# Patient Record
Sex: MALE | Race: WHITE | Employment: UNEMPLOYED | URBAN - METROPOLITAN AREA
[De-identification: names, ages, dates, MRNs, and addresses within clinical notes are randomized per-mention and may not be internally consistent; named-entity substitution may affect disease eponyms.]

---
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
## 2024-01-10 ENCOUNTER — OFFICE VISIT (OUTPATIENT)
Dept: NEUROLOGY | Facility: CLINIC | Age: 19
End: 2024-01-10
Payer: COMMERCIAL

## 2024-01-10 VITALS
DIASTOLIC BLOOD PRESSURE: 80 MMHG | HEIGHT: 71 IN | BODY MASS INDEX: 20.05 KG/M2 | SYSTOLIC BLOOD PRESSURE: 120 MMHG | HEART RATE: 83 BPM | WEIGHT: 143.2 LBS

## 2024-01-10 DIAGNOSIS — G43.109 MIGRAINE WITH AURA AND WITHOUT STATUS MIGRAINOSUS, NOT INTRACTABLE: Primary | ICD-10-CM

## 2024-01-10 PROCEDURE — 99213 OFFICE O/P EST LOW 20 MIN: CPT | Performed by: PSYCHIATRY & NEUROLOGY

## 2024-01-10 NOTE — PROGRESS NOTES
Oh Monroe is a 18 y.o. male who returns in follow-up today with history of migraine headache    Assessment:  1. Migraine with aura and without status migrainosus, not intractable        Plan:  Continue Imitrex as needed  Follow-up 1 year    Discussion:  Oh reports 1 migraine headache over the last year and found the 25 mg Imitrex effective in stopping it.  He noted no adverse effect.  If he needs refills he will notify me otherwise I will see him back in follow-up in 1 year      Subjective:  Oh returns in follow-up today.  He reports that since he was here last a year ago he is only had 1 migraine headache.  He states he took Imitrex and it got rid of the headache effectively.  Is happy that the frequency of his headaches has improved.  He denies any new medical issues and is currently working and going to school online      Past Medical History:   Diagnosis Date    Abnormal head MRI 6/6/2018    Acute non-recurrent maxillary sinusitis 5/31/2018    Concussion without loss of consciousness 6/14/2019    Evaluated in the [facility] ER    Croup 11/09/2015    GERD (gastroesophageal reflux disease) 09/2017    Headache 9/15/2017    History of torn meniscus of left knee     Lyme disease 06/2015    Strep throat        Family History:  Family History   Problem Relation Age of Onset    No Known Problems Mother     No Known Problems Father     No Known Problems Sister     Cancer Maternal Grandmother         colon    Stroke Paternal Grandmother     Diabetes Paternal Grandmother     Cancer Family         MGGM-colon    Diabetes Family         PGGM    Alcohol abuse Neg Hx     Drug abuse Neg Hx     Mental illness Neg Hx        Past Surgical History:  Past Surgical History:   Procedure Laterality Date    NO PAST SURGERIES         Social History:   reports that he has never smoked. He has never used smokeless tobacco. He reports that he does not drink alcohol and does not use drugs.    Allergies:  Patient has no known  "allergies.      Current Outpatient Medications:     SUMAtriptan (Imitrex) 25 mg tablet, Take 1 tablet (25 mg total) by mouth once as needed for migraine for up to 1 dose Can repeat dose in 15 min if not better, Disp: 20 tablet, Rfl: 1    doxycycline (ADOXA) 100 MG tablet, TAKE 1 TABLET (100 MG TOTAL) BY MOUTH 2 (TWO) TIMES A DAY FOR 14 DAYS. (Patient not taking: No sig reported), Disp: , Rfl:     fluticasone (FLONASE) 50 mcg/act nasal spray, 1 spray into each nostril daily (Patient not taking: Reported on 2/8/2021), Disp: 16 g, Rfl: 0    naproxen (NAPROSYN) 500 mg tablet, PLEASE SEE ATTACHED FOR DETAILED DIRECTIONS (Patient not taking: No sig reported), Disp: , Rfl:     I have reviewed the past medical, social and family history, current medications, allergies, vitals, review of systems and updated this information as appropriate today     Objective:    Vitals:  Blood pressure 120/80, pulse 83, height 5' 11\" (1.803 m), weight 65 kg (143 lb 3.2 oz).    Physical Exam    Neurological Exam  GENERAL: Well-developed well-nourished man in no acute distress  HEENT/NECK: Head is atraumatic normocephalic, neck is supple  NEUROLOGIC:  Mental Status: Awake and alert without aphasia  Cranial Nerves: Extraocular movements are full. Face is symmetrical  Coordination: Gait is stable      ROS:    Review of Systems   Constitutional:  Negative for appetite change, fatigue and fever.   HENT: Negative.  Negative for hearing loss, tinnitus, trouble swallowing and voice change.    Eyes: Negative.  Negative for photophobia, pain and visual disturbance.   Respiratory: Negative.  Negative for shortness of breath.    Cardiovascular: Negative.  Negative for palpitations.   Gastrointestinal: Negative.  Negative for nausea and vomiting.   Endocrine: Negative.  Negative for cold intolerance.   Genitourinary: Negative.  Negative for dysuria, frequency and urgency.   Musculoskeletal:  Negative for back pain, gait problem, myalgias, neck pain and " neck stiffness.   Skin: Negative.  Negative for rash.   Allergic/Immunologic: Negative.    Neurological: Negative.  Negative for dizziness, tremors, seizures, syncope, facial asymmetry, speech difficulty, weakness, light-headedness, numbness and headaches.   Hematological: Negative.  Does not bruise/bleed easily.   Psychiatric/Behavioral: Negative.  Negative for confusion, hallucinations and sleep disturbance.

## 2024-05-13 ENCOUNTER — TELEPHONE (OUTPATIENT)
Dept: NEUROLOGY | Facility: CLINIC | Age: 19
End: 2024-05-13